# Patient Record
Sex: FEMALE | Race: BLACK OR AFRICAN AMERICAN | Employment: FULL TIME | URBAN - METROPOLITAN AREA
[De-identification: names, ages, dates, MRNs, and addresses within clinical notes are randomized per-mention and may not be internally consistent; named-entity substitution may affect disease eponyms.]

---

## 2024-05-21 ENCOUNTER — CLINICAL SUPPORT (OUTPATIENT)
Dept: INFECTIOUS DISEASES | Facility: CLINIC | Age: 29
End: 2024-05-21
Payer: MEDICAID

## 2024-05-21 ENCOUNTER — OFFICE VISIT (OUTPATIENT)
Dept: INFECTIOUS DISEASES | Facility: CLINIC | Age: 29
End: 2024-05-21
Payer: MEDICAID

## 2024-05-21 VITALS
DIASTOLIC BLOOD PRESSURE: 81 MMHG | HEART RATE: 76 BPM | TEMPERATURE: 99 F | SYSTOLIC BLOOD PRESSURE: 123 MMHG | BODY MASS INDEX: 31.67 KG/M2 | WEIGHT: 197.06 LBS | HEIGHT: 66 IN

## 2024-05-21 DIAGNOSIS — Z71.84 TRAVEL ADVICE ENCOUNTER: Primary | ICD-10-CM

## 2024-05-21 PROCEDURE — 90471 IMMUNIZATION ADMIN: CPT | Mod: PBBFAC

## 2024-05-21 PROCEDURE — 90717 YELLOW FEVER VACCINE SUBQ: CPT | Mod: PBBFAC

## 2024-05-21 PROCEDURE — 1159F MED LIST DOCD IN RCRD: CPT | Mod: CPTII,,, | Performed by: INTERNAL MEDICINE

## 2024-05-21 PROCEDURE — 99402 PREV MED CNSL INDIV APPRX 30: CPT | Mod: S$PBB,,, | Performed by: INTERNAL MEDICINE

## 2024-05-21 PROCEDURE — 99202 OFFICE O/P NEW SF 15 MIN: CPT | Mod: PBBFAC,25 | Performed by: INTERNAL MEDICINE

## 2024-05-21 PROCEDURE — 3008F BODY MASS INDEX DOCD: CPT | Mod: CPTII,,, | Performed by: INTERNAL MEDICINE

## 2024-05-21 PROCEDURE — 3074F SYST BP LT 130 MM HG: CPT | Mod: CPTII,,, | Performed by: INTERNAL MEDICINE

## 2024-05-21 PROCEDURE — 3079F DIAST BP 80-89 MM HG: CPT | Mod: CPTII,,, | Performed by: INTERNAL MEDICINE

## 2024-05-21 PROCEDURE — 99999PBSHW YELLOW FEVER VACCINE SQ: Mod: PBBFAC,,,

## 2024-05-21 PROCEDURE — 99999 PR PBB SHADOW E&M-NEW PATIENT-LVL II: CPT | Mod: PBBFAC,,, | Performed by: INTERNAL MEDICINE

## 2024-05-21 NOTE — PROGRESS NOTES
INFECTIOUS DISEASE TRAVEL CLINIC  05/21/2024 4:00 PM    Subjective:      Chief Complaint:   Chief Complaint   Patient presents with    Travel Consult       History of Present Illness    Patient Crissy Taylor is a 28 y.o. female who presents today for routine pretravel consultation.  The patient reports a past medical history of asthma.   The patient will be traveling to  [facility] on 5/22. The purpose of this trip is for study abroad. The patient will be at this destination for 2 wks days.  The patient will be lodging at a hotel.  Will be visiting the regions of [facility], [facility], [facility].    The patient has travelled to the following other countries in the past; NA.  The patient reports that they have all their childhood vaccinations.  The patient reports receipt of the following travel related vaccinations; NA.      Already seen at a different travel clinic and was administered typhoid vaccine and prescribed malaria ppx.    Have you ever received:  YES NO DATES  Routine Childhood vaccines  [x]         []       Influenza vaccine   [x]         []     Prevnar    []         [x]     Pneumovax    []         [x]     Tetanus-diptheria -pertussis  [x]         []     Hepatitis A vaccine series       [x]         []     Hepatitis B vaccine series         [x]         []     Meningitis vaccine   [x]         []     Zoster vaccine    []         [x]    Typhoid vaccine   [x]         []   Yellow fever vaccine   []         [x]   Japanese encephalitis vaccine []         [x]   Rabies vaccine   []         [x]     PMHx:   Med: malaria pills- doxycycline  Allergies: NA    Review of Symptoms:  Constitutional: Denies fevers, chills, or weakness.  Cardiovascular: Denies chest pain, palpitations  Respiratory: Denies shortness of breath, cough, hemoptysis  GI: Denies nausea/vomitting,, abd pain  : Denies dysuria  Musculoskeletal: Denies joint pain or myalgias.  Skin/breast: Denies rashes, lumps, lesions, or discharge.  Neurologic: Denies  "headache     No past medical history on file.    No past surgical history on file.    No family history on file.    Social History     Socioeconomic History    Marital status: Unknown       Review of patient's allergies indicates:   Allergen Reactions    Orange Hives         Objective:   VS (24h):   Vitals:    05/21/24 1549   BP: 123/81   Pulse: 76   Temp: 98.5 °F (36.9 °C)     General: Afebrile, alert, comfortable, no acute distress.   HEENT: APRIL. EOMI, no scleral icterus.   Pulmonary: Non labored  Extremities: Moves all extremities x 4.   Skin: No jaundice, rashes, or visible lesions.   Neurological:  Alert and oriented x 4.     No results found for: "GLU"  No results found for: "CALCIUM"  No results found for: "ALBUMIN"  No results found for: "PROT"  No results found for: "NA"  No results found for: "K"  No results found for: "CO2"  No results found for: "CL"  No results found for: "BUN"  No results found for: "CREATININE"  No results found for: "ALKPHOS"  No results found for: "ALT"  No results found for: "AST"  No results found for: "BILITOT"  No results found for: "WBC"  No results found for: "HGB"  No results found for: "HCT"  No results found for: "MCV"  No results found for: "PLT"  No results found for: "CHOL"  No results found for: "HDL"  No results found for: "LDLCALC"  No results found for: "TRIG"  No results found for: "CHOLHDL"    HIV: No components found for: "HIV 1/2 AG/AB"  Hepatitis C IgG: No components found for: "HEPATITIS C"  Syphilis: No results found for: "RPR"    Hepatitis A IgG: No components found for: "HEPATITIS A IGG"  Hepatitis Bc IgG: No components found for: "HEPATITIS B CORE IGG"  Hepatitis Bs IgG:  Quantiferon: No results found for: "QUANTIFERON"        Assessment:     Pre-Travel clinic assessment  Vaccine counseling    Plan:     Patient specific risks:      The patient was provided with an extensive travel guidance packet which provides travel information specific to the patients " itinerary.     The patient's medical history was reviewed and the patient was counseled on:    Precautions against development of DVT during flight.    Registering with the state department and travel insurance was recommended in case of emergency.     Destination specific risks:      -Infectious Disease risks:      Mosquito Borne pathogens:  Reviewed basic mosquito avoidance precautions including wearing long sleeve clothing and insect repellant.  to prevent insect-borne diseases (e.g., malaria, dengue).The patient was also instructed to purchase insect repellent containing DEET (25-35%; higher strengths last longer, but >35% will damage synthetic materials) and apply premethrin spray on clothing according to repellent label instructions.         Food Borne pathogens:  Reviewed basic hand, food and water sanitation precautions.  Patient instructed to take hand  on their trip. The patient was instructed to purchase Imodium over the counter to take in case diarrhea (without blood or fever) develops.     STDs:  Risk of STDs reviewed with the patient. Discussed Personal protective measures     Rabies: Discussed the risk of rabies and precautions to prevent exposure. Patient is aware to seek prompt medical care should they be exposed.       -Environmental risks:     Personal and travel safety. Precautions to minimize risk/exposure to crime and motor vehicle accidents were reviewed with the patient.    Precautions against sun exposure.         -Vaccinations:  The patient's immunization history was reviewed and, based on the patient's itinerary, the following immunizations were ordered:     - Yellow fever vaccine      The patient was encouraged to contact us about any problems that may develop after immunization and possible side effects were reviewed.      The patient was instructed to contact us if problems develop after travel.    > 30 min spent with patient and >50% of time spent counseling about  "travel    Franchesca ELIDA Gonzalez MD  Infectious Diseases      Today:  - yellow fever  At pharmacy:    Malaria prophylaxis  - was prescribed doxycyline by different provider    Traveler's diarrhea  - bring imodium with you (available over the counter. If diarrhea severe, lasting longer than 4 days or blood in stool, take the antibiotic as we discussed.     The "Simple Solution" - Home made Oral Rehydration Salts (ORS) Recipe:  Six (6) level teaspoons of Sugar.  Half (1/2) level teaspoon of Salt.  One Litre of clean drinking or boiled water and then cooled - 5 cupfuls (each cup about 200 ml.)          "